# Patient Record
(demographics unavailable — no encounter records)

---

## 2025-07-03 NOTE — PHYSICAL EXAM

## 2025-07-03 NOTE — HISTORY OF PRESENT ILLNESS
[FreeTextEntry1] : 42-year-old female with no known past medical history comes in to establish care.  Overall, feels well denies chest pain shortness of breath PND orthopnea.  Her mother does have a cardiomyopathy she is concerned and wants to be evaluated.

## 2025-07-03 NOTE — DISCUSSION/SUMMARY
[FreeTextEntry1] : 1.  Cardiac murmur: Given patient's family history of cardiomyopathy will obtain an echocardiogram and an EKG and blood work and advise once results are known. [EKG obtained to assist in diagnosis and management of assessed problem(s)] : EKG obtained to assist in diagnosis and management of assessed problem(s)